# Patient Record
Sex: FEMALE | Race: WHITE | NOT HISPANIC OR LATINO | Employment: OTHER | URBAN - METROPOLITAN AREA
[De-identification: names, ages, dates, MRNs, and addresses within clinical notes are randomized per-mention and may not be internally consistent; named-entity substitution may affect disease eponyms.]

---

## 2024-01-18 NOTE — PROGRESS NOTES
Subjective     Patient ID: [id number]   Brielle Reyes is a 73 y.o. female, known to have seronegative RA?, OA of the neck, knees, chronic depression, chronic sinusitis, gastritis, GERD, DL, HTN, obesity, who presents for management of RA    Current rheum meds:  - Tylenol  - Aleve  - Dicolfenac gel    Previous rheum meds:  - Methotrexate 15 mg weekly plus FA 1 mg daily started in 9/23, stopped it 2 weeks after due to vomiting  - Leflunomide 10 mg daily started in 9/23 and kept having diarrhea, so stopped it in November 2023,   - Prednisone 10 mg with taper every 2 weeks by 2.5 mg, done in 11/23  -  mg BID started in November 2023, had nausea and diarrhea, stopped early December 2023       HPI  She was seeing Dr. Donovan, last fuv 11/23 9/23 saw her PCP for diffuse joint pains, referred her to Dr. Donovan who diagnosed her as seronegative rheumatoid arthritis  Started on methotrexate 6 tablets and folic acid as well as prednisone 10 mg for 2 weeks and taper by 2.5 mg every 2 weeks, she had a lot of vomiting so she was switched to Leflunomide, also had GI SE, so she was switched to HCQ, also had GI SE and stopped it   Currently, off everything   Saw PCP, told to try gluten free diet, it helped with her joint pains by 80% and GI sx  Seeing ortho, got steroid injections in both knees for OA  Today, ankles are sore, other joints pains improved with the gluten free diet   Swelling in the right ankle  Hands and fingers get numbness at night   MS of both knees, an hour, improves as she walks     + tension headaches   + dry eyes, uses eye drops  + miscarriage of her first pregnancy 8 weeks of gestation, no diagnoses   + used to have constipation, now more regular  + leg cramps  + itchy back, dry   + bilateral plantar fasciitis     PSH: Benign breast lumpectomy, cataracts bilaterally, appendectomy, cholecystectomy, left ovary removed  Allergies: NKDA   Habits: No smoking, no alcohol, no drugs   Social hx: ,   has dementia, 3 kids, retired teacher/     ROS:  Constitutional: Denies fever, chills, weight loss, night sweats  Eyes: Denies blurry vision, redness or pain  ENT: Denies dry mouth, dental loss, loss of taste, nasal or oral ulcers, jaw claudication, difficulty swallowing, nasal crusting or recurrent sinus infections   Cardiovascular: Denies chest pains, palpitations, orthopnea  Respiratory: Denies shortness of breath, cough, asthma, or recurrent respiratory infections  Gastrointestinal: Denies dysphagia, nausea, vomiting, heartburn, abdominal pain, constipation, diarrhea, melena or hematochezia  Genitourinary: No recurrent urinary infections or STDs, no genital or anal ulcers  Integumentary: Denies photosensitivity, rash or lesions, Raynaud's phenomenon, skin tightening, digital ulcers, psoriatic lesions, or alopecia  Neurological: Denies any numbness or tingling, muscle weakness, or incontinence   Hematologic/Lymphatic: Denies bleeding, bruising, history of clots (arterial or venous), or pregnancy complications   MSK: No joint redness, hotness or swelling. No inflammatory back pain, dactylitis  Muscular: Denies weakness, difficulty rising from chair or combing the hair, muscle aches, or problems with hand    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     Patient Active Problem List   Diagnosis    Acquired hammertoe    Acute bacterial conjunctivitis    Acute UTI    Arthritis of both knees    Bilateral otitis media    Breast pain    Chondrocalcinosis    Chronic allergic rhinitis    Chronic depression    Cyst of left breast    Depression, major, recurrent, mild (CMS/HCC)    Discomfort of vagina    Dizziness    Dysuria    Cerebrovascular disease    Essential hypertension    High blood pressure    Chronic GERD    Gastroesophageal reflux disease    Gastritis    Hypercholesterolemia    Hyperlipidemia    Jaw pain    Neck pain    Right sided temporal headache    Long-term use of immunosuppressant medication     Mastoiditis of left side    Acute non-recurrent maxillary sinusitis    Chronic sinusitis    Maxillary sinusitis    Migraines    Mucus in stool    Obesity (BMI 30-39.9)    Orthostatic hypotension    Cervical myofascial pain syndrome    Osteoarthritis of neck    Otitis media, serous, acute    Panic attacks    Panic disorder    Pelvic pain    Peripheral neuropathy    Right knee pain    Seronegative rheumatoid arthritis (CMS/HCC)    Social anxiety disorder    Nasal bleeding    Sore throat    Throat pain    Swelling in right armpit    Urinary urgency    Vaginal candidiasis    Vulvar pruritus    Weight loss      History reviewed. No pertinent past medical history.     History reviewed. No pertinent surgical history.     Social History     Socioeconomic History    Marital status:      Spouse name: Not on file    Number of children: Not on file    Years of education: Not on file    Highest education level: Not on file   Occupational History    Not on file   Tobacco Use    Smoking status: Never    Smokeless tobacco: Never   Substance and Sexual Activity    Alcohol use: Never    Drug use: Never    Sexual activity: Not on file   Other Topics Concern    Not on file   Social History Narrative    Not on file     Social Determinants of Health     Financial Resource Strain: Not on file   Food Insecurity: Not on file   Transportation Needs: Not on file   Physical Activity: Not on file   Stress: Not on file   Social Connections: Not on file   Intimate Partner Violence: Not on file   Housing Stability: Not on file      Allergies   Allergen Reactions    Allerg Xt,D.Farinae-D.Pteronys Unknown    Amoxicillin-Pot Clavulanate Unknown    Bee Pollen Unknown    House Dust Unknown    Mold Unknown    Latex Itching and Rash      Current Outpatient Medications:     acetaminophen (Tylenol 8 HOUR) 650 mg ER tablet, Take 1 tablet (650 mg) by mouth., Disp: , Rfl:     DULoxetine (Cymbalta) 30 mg DR capsule, Take 1 capsule (30 mg) by mouth once  "daily., Disp: , Rfl:     irbesartan-hydrochlorothiazide (Avalide) 150-12.5 mg tablet, Take by mouth., Disp: , Rfl:     amLODIPine (Norvasc) 10 mg tablet, Take 1 tablet (10 mg) by mouth once daily at bedtime., Disp: , Rfl:     fenofibrate micronized (Lofibra) 134 mg capsule, Take 1 capsule (134 mg) by mouth once daily with breakfast., Disp: , Rfl:     omeprazole (PriLOSEC) 40 mg DR capsule, Take 1 capsule (40 mg) by mouth once daily. Take before a meal, Disp: , Rfl:      Objective   Visit Vitals  /81 (BP Location: Right arm, Patient Position: Sitting, BP Cuff Size: Adult)   Pulse 98   Temp 36.3 °C (97.3 °F) (Temporal)   Resp 16   Ht 1.6 m (5' 3\")   Wt 77.1 kg (170 lb)   SpO2 98%   BMI 30.11 kg/m²   Smoking Status Never   BSA 1.85 m²      Physical Exam:  General: AAOx3, Cooperative  Head: normocephalic, atraumatic, no hair loss   Eyes: EOMI, conjunctiva clear, sclera white, anicteric  Ears: hearing intact  Nose: no deformity, no crusting   Throat/Mouth: No oral deformities, no cheek swelling, mucosa appear moist, no oral ulcers noted or loss of dentition   Neck/Lymph: FROM, trachea midline  Lungs: chest expansion symmetric, clear to auscultation bilaterally. No wheezing, rhonchi, or stridor  Heart: S1, S2, RRR. No murmur or rub  Abdomen: Soft, non-tender without masses  Skin: No rashes, ulcers or photosensitive areas  MSK: Upper Extremities:  Hand/Fingers: No erythema, edema, tenderness or warmth at DIP, PIP, or MCP joints, FROM grossly. Good hand . No nodules. No deformities   Wrists: Right wrist TTP. No erythema, edema, warmth or tenderness at wrist, FROM grossly  Elbows: No tenderness, edema, erythema or warmth at elbows, FROM grossly. No nodules   Shoulders: No edema, erythema, tenderness or warmth at shoulders. FROM  Lower Extremities:   Hips: No obvious deformities. No joint tenderness, normal ROM grossly. Log roll test negative bilaterally. Khris test is negative bilaterally. No trochanteric bursae " TTP  Knees: No tenderness, deformities, edema, rashes, or warmth, normal ROM grossly. No crepitus, no pes anserine bursa TTP   Ankles, feet: No deformities, tenderness, edema, erythema, ulceration, or warmth at the ankle or MTP/IP joints, normal ROM grossly. TTP of bilateral heels   Spine: No spinal tenderness to palpation. Right SI joint tenderness      Assessment/Plan    This is a 73 y.o. female, known to have seronegative RA?? and OA of the neck and knees, knees, who presents for a second opinion    The patient's picture currently is not suggestive or RA or CTD, her joint pains improved after gluten free diet, no current synovitis on exam. Her work up with Dr. Donovan is positive for SANDOVAL and Anti-centromere, no sx currently of scleroderma. She tried 3 different DMARDs with SE to all 3, she also took steroids for 2 months without any improvement in her sx, suggestive more of mechanical and less of inflammatory disease at this moment. Will complete the work up     Labs:  11/23: SANDOVAL positive, RF/CCP negative, CBC, SPEP, Uprt/Cr, Cr 1.13, AP 43, (Low), CRP, TSH, CK, C4, C3, B12, FT4 wnl  STEPHANIE panel showing anti-centromere 1188    Imaging:  Xray knees 3/2022 and 5/2022: No results     - Labs tests today   - Xray hands   - keep off tx for now  - Continue off gluten  - Tylenol as needed for pain  - I will Inform the pt of any urgent results, if any     RTC in 1 month or discussion of results     Plan, including risks and benefits, was discussed with the patient, informed on how to reach us.     To schedule an appointment, call [phone]  To reach the rheumatology office, call [phone]    La Huang MD   Division of Rheumatology  [facility]

## 2024-01-22 ENCOUNTER — LAB (OUTPATIENT)
Dept: LAB | Facility: LAB | Age: 74
End: 2024-01-22
Payer: MEDICARE

## 2024-01-22 ENCOUNTER — HOSPITAL ENCOUNTER (OUTPATIENT)
Dept: RADIOLOGY | Facility: CLINIC | Age: 74
Discharge: HOME | End: 2024-01-22
Payer: MEDICARE

## 2024-01-22 ENCOUNTER — OFFICE VISIT (OUTPATIENT)
Dept: RHEUMATOLOGY | Facility: CLINIC | Age: 74
End: 2024-01-22
Payer: MEDICARE

## 2024-01-22 VITALS
OXYGEN SATURATION: 98 % | RESPIRATION RATE: 16 BRPM | BODY MASS INDEX: 30.12 KG/M2 | SYSTOLIC BLOOD PRESSURE: 146 MMHG | WEIGHT: 170 LBS | HEIGHT: 63 IN | DIASTOLIC BLOOD PRESSURE: 81 MMHG | TEMPERATURE: 97.3 F | HEART RATE: 98 BPM

## 2024-01-22 DIAGNOSIS — E55.9 VITAMIN D DEFICIENCY: ICD-10-CM

## 2024-01-22 DIAGNOSIS — R76.8 POSITIVE ANA (ANTINUCLEAR ANTIBODY): ICD-10-CM

## 2024-01-22 DIAGNOSIS — M25.50 POLYARTHRALGIA: ICD-10-CM

## 2024-01-22 DIAGNOSIS — R76.8 ANTICENTROMERE ANTIBODIES PRESENT: ICD-10-CM

## 2024-01-22 DIAGNOSIS — M25.50 POLYARTHRALGIA: Primary | ICD-10-CM

## 2024-01-22 PROBLEM — I10 ESSENTIAL HYPERTENSION: Status: ACTIVE | Noted: 2020-12-15

## 2024-01-22 PROBLEM — G43.909 MIGRAINES: Status: ACTIVE | Noted: 2024-01-22

## 2024-01-22 PROBLEM — H10.30 ACUTE BACTERIAL CONJUNCTIVITIS: Status: ACTIVE | Noted: 2024-01-22

## 2024-01-22 PROBLEM — I67.9 CEREBROVASCULAR DISEASE: Status: ACTIVE | Noted: 2024-01-22

## 2024-01-22 PROBLEM — B37.31 VAGINAL CANDIDIASIS: Status: ACTIVE | Noted: 2023-11-28

## 2024-01-22 PROBLEM — J01.00 ACUTE NON-RECURRENT MAXILLARY SINUSITIS: Status: ACTIVE | Noted: 2024-01-22

## 2024-01-22 PROBLEM — I95.1 ORTHOSTATIC HYPOTENSION: Status: ACTIVE | Noted: 2024-01-22

## 2024-01-22 PROBLEM — M79.89 SWELLING IN RIGHT ARMPIT: Status: ACTIVE | Noted: 2024-01-22

## 2024-01-22 PROBLEM — J32.0 MAXILLARY SINUSITIS: Status: ACTIVE | Noted: 2024-01-22

## 2024-01-22 PROBLEM — N94.9 DISCOMFORT OF VAGINA: Status: ACTIVE | Noted: 2024-01-22

## 2024-01-22 PROBLEM — M17.0 ARTHRITIS OF BOTH KNEES: Status: ACTIVE | Noted: 2023-07-11

## 2024-01-22 PROBLEM — R04.0 NASAL BLEEDING: Status: ACTIVE | Noted: 2024-01-22

## 2024-01-22 PROBLEM — R39.15 URINARY URGENCY: Status: ACTIVE | Noted: 2024-01-22

## 2024-01-22 PROBLEM — R51.9 RIGHT SIDED TEMPORAL HEADACHE: Status: ACTIVE | Noted: 2024-01-22

## 2024-01-22 PROBLEM — F41.0 PANIC ATTACKS: Status: ACTIVE | Noted: 2024-01-22

## 2024-01-22 PROBLEM — R30.0 DYSURIA: Status: ACTIVE | Noted: 2024-01-22

## 2024-01-22 PROBLEM — E78.5 HYPERLIPIDEMIA: Status: ACTIVE | Noted: 2020-12-15

## 2024-01-22 PROBLEM — M79.18 CERVICAL MYOFASCIAL PAIN SYNDROME: Status: ACTIVE | Noted: 2024-01-22

## 2024-01-22 PROBLEM — H70.92 MASTOIDITIS OF LEFT SIDE: Status: ACTIVE | Noted: 2024-01-22

## 2024-01-22 PROBLEM — M54.2 NECK PAIN: Status: ACTIVE | Noted: 2024-01-22

## 2024-01-22 PROBLEM — F41.0 PANIC DISORDER: Status: ACTIVE | Noted: 2024-01-22

## 2024-01-22 PROBLEM — F32.A CHRONIC DEPRESSION: Status: ACTIVE | Noted: 2024-01-22

## 2024-01-22 PROBLEM — E78.00 HYPERCHOLESTEROLEMIA: Status: ACTIVE | Noted: 2024-01-22

## 2024-01-22 PROBLEM — J02.9 SORE THROAT: Status: ACTIVE | Noted: 2024-01-22

## 2024-01-22 PROBLEM — J30.9 CHRONIC ALLERGIC RHINITIS: Status: ACTIVE | Noted: 2024-01-22

## 2024-01-22 PROBLEM — H66.93 BILATERAL OTITIS MEDIA: Status: ACTIVE | Noted: 2024-01-22

## 2024-01-22 PROBLEM — M47.812 OSTEOARTHRITIS OF NECK: Status: ACTIVE | Noted: 2024-01-22

## 2024-01-22 PROBLEM — K29.70 GASTRITIS: Status: ACTIVE | Noted: 2024-01-22

## 2024-01-22 PROBLEM — N39.0 ACUTE UTI: Status: ACTIVE | Noted: 2024-01-22

## 2024-01-22 PROBLEM — L29.2 VULVAR PRURITUS: Status: ACTIVE | Noted: 2024-01-22

## 2024-01-22 PROBLEM — I10 HIGH BLOOD PRESSURE: Status: ACTIVE | Noted: 2024-01-22

## 2024-01-22 PROBLEM — K21.9 GASTROESOPHAGEAL REFLUX DISEASE: Status: ACTIVE | Noted: 2020-12-15

## 2024-01-22 PROBLEM — K21.9 CHRONIC GERD: Status: ACTIVE | Noted: 2024-01-22

## 2024-01-22 PROBLEM — M25.561 RIGHT KNEE PAIN: Status: ACTIVE | Noted: 2024-01-22

## 2024-01-22 PROBLEM — F33.0 DEPRESSION, MAJOR, RECURRENT, MILD (CMS-HCC): Status: ACTIVE | Noted: 2024-01-22

## 2024-01-22 PROBLEM — J32.9 CHRONIC SINUSITIS: Status: ACTIVE | Noted: 2024-01-22

## 2024-01-22 PROBLEM — M20.40 ACQUIRED HAMMERTOE: Status: ACTIVE | Noted: 2024-01-22

## 2024-01-22 PROBLEM — R42 DIZZINESS: Status: ACTIVE | Noted: 2024-01-22

## 2024-01-22 PROBLEM — F40.10 SOCIAL ANXIETY DISORDER: Status: ACTIVE | Noted: 2024-01-22

## 2024-01-22 PROBLEM — H65.00 OTITIS MEDIA, SEROUS, ACUTE: Status: ACTIVE | Noted: 2024-01-22

## 2024-01-22 PROBLEM — R07.0 THROAT PAIN: Status: ACTIVE | Noted: 2024-01-22

## 2024-01-22 PROBLEM — R63.4 WEIGHT LOSS: Status: ACTIVE | Noted: 2024-01-22

## 2024-01-22 PROBLEM — R68.84 JAW PAIN: Status: ACTIVE | Noted: 2024-01-22

## 2024-01-22 PROBLEM — G62.9 PERIPHERAL NEUROPATHY: Status: ACTIVE | Noted: 2024-01-22

## 2024-01-22 PROBLEM — M06.00 SERONEGATIVE RHEUMATOID ARTHRITIS (MULTI): Status: ACTIVE | Noted: 2023-11-28

## 2024-01-22 PROBLEM — E66.9 OBESITY (BMI 30-39.9): Status: ACTIVE | Noted: 2024-01-22

## 2024-01-22 PROBLEM — N60.02 CYST OF LEFT BREAST: Status: ACTIVE | Noted: 2024-01-22

## 2024-01-22 PROBLEM — N64.4 BREAST PAIN: Status: ACTIVE | Noted: 2024-01-22

## 2024-01-22 PROBLEM — Z79.60 LONG-TERM USE OF IMMUNOSUPPRESSANT MEDICATION: Status: ACTIVE | Noted: 2023-11-28

## 2024-01-22 PROBLEM — M11.20 CHONDROCALCINOSIS: Status: ACTIVE | Noted: 2024-01-22

## 2024-01-22 PROBLEM — R10.2 PELVIC PAIN: Status: ACTIVE | Noted: 2024-01-22

## 2024-01-22 PROBLEM — R19.5 MUCUS IN STOOL: Status: ACTIVE | Noted: 2023-11-28

## 2024-01-22 LAB
25(OH)D3 SERPL-MCNC: 30 NG/ML (ref 30–100)
ALBUMIN SERPL BCP-MCNC: 4.5 G/DL (ref 3.4–5)
ALP SERPL-CCNC: 37 U/L (ref 33–136)
ALT SERPL W P-5'-P-CCNC: 18 U/L (ref 7–45)
ANION GAP SERPL CALC-SCNC: 14 MMOL/L (ref 10–20)
AST SERPL W P-5'-P-CCNC: 29 U/L (ref 9–39)
BASOPHILS # BLD AUTO: 0.06 X10*3/UL (ref 0–0.1)
BASOPHILS NFR BLD AUTO: 0.9 %
BILIRUB SERPL-MCNC: 0.4 MG/DL (ref 0–1.2)
BUN SERPL-MCNC: 17 MG/DL (ref 6–23)
C3 SERPL-MCNC: 177 MG/DL (ref 87–200)
C4 SERPL-MCNC: 33 MG/DL (ref 10–50)
CALCIUM SERPL-MCNC: 9.9 MG/DL (ref 8.6–10.3)
CHLORIDE SERPL-SCNC: 104 MMOL/L (ref 98–107)
CK SERPL-CCNC: 45 U/L (ref 0–215)
CO2 SERPL-SCNC: 28 MMOL/L (ref 21–32)
CREAT SERPL-MCNC: 0.94 MG/DL (ref 0.5–1.05)
CREAT UR-MCNC: 89.2 MG/DL (ref 20–320)
CRP SERPL-MCNC: 0.11 MG/DL
EGFRCR SERPLBLD CKD-EPI 2021: 65 ML/MIN/1.73M*2
EOSINOPHIL # BLD AUTO: 0.28 X10*3/UL (ref 0–0.4)
EOSINOPHIL NFR BLD AUTO: 4 %
ERYTHROCYTE [DISTWIDTH] IN BLOOD BY AUTOMATED COUNT: 15.4 % (ref 11.5–14.5)
ERYTHROCYTE [SEDIMENTATION RATE] IN BLOOD BY WESTERGREN METHOD: 10 MM/H (ref 0–30)
GLUCOSE SERPL-MCNC: 100 MG/DL (ref 74–99)
HCT VFR BLD AUTO: 42.9 % (ref 36–46)
HGB BLD-MCNC: 13.3 G/DL (ref 12–16)
IMM GRANULOCYTES # BLD AUTO: 0.02 X10*3/UL (ref 0–0.5)
IMM GRANULOCYTES NFR BLD AUTO: 0.3 % (ref 0–0.9)
LYMPHOCYTES # BLD AUTO: 2.1 X10*3/UL (ref 0.8–3)
LYMPHOCYTES NFR BLD AUTO: 30.3 %
MCH RBC QN AUTO: 27.5 PG (ref 26–34)
MCHC RBC AUTO-ENTMCNC: 31 G/DL (ref 32–36)
MCV RBC AUTO: 89 FL (ref 80–100)
MONOCYTES # BLD AUTO: 0.99 X10*3/UL (ref 0.05–0.8)
MONOCYTES NFR BLD AUTO: 14.3 %
NEUTROPHILS # BLD AUTO: 3.47 X10*3/UL (ref 1.6–5.5)
NEUTROPHILS NFR BLD AUTO: 50.2 %
NRBC BLD-RTO: 0 /100 WBCS (ref 0–0)
PLATELET # BLD AUTO: 328 X10*3/UL (ref 150–450)
POTASSIUM SERPL-SCNC: 4.4 MMOL/L (ref 3.5–5.3)
PROT SERPL-MCNC: 7 G/DL (ref 6.4–8.2)
PROT SERPL-MCNC: 7.3 G/DL (ref 6.4–8.2)
PROT UR-ACNC: 13 MG/DL (ref 5–24)
PROT/CREAT UR: 0.15 MG/MG CREAT (ref 0–0.17)
RBC # BLD AUTO: 4.83 X10*6/UL (ref 4–5.2)
RHEUMATOID FACT SER NEPH-ACNC: <10 IU/ML (ref 0–15)
SODIUM SERPL-SCNC: 142 MMOL/L (ref 136–145)
TSH SERPL-ACNC: 2.04 MIU/L (ref 0.44–3.98)
WBC # BLD AUTO: 6.9 X10*3/UL (ref 4.4–11.3)

## 2024-01-22 PROCEDURE — 84156 ASSAY OF PROTEIN URINE: CPT

## 2024-01-22 PROCEDURE — 86235 NUCLEAR ANTIGEN ANTIBODY: CPT

## 2024-01-22 PROCEDURE — 86320 SERUM IMMUNOELECTROPHORESIS: CPT | Performed by: STUDENT IN AN ORGANIZED HEALTH CARE EDUCATION/TRAINING PROGRAM

## 2024-01-22 PROCEDURE — 36415 COLL VENOUS BLD VENIPUNCTURE: CPT

## 2024-01-22 PROCEDURE — 82550 ASSAY OF CK (CPK): CPT

## 2024-01-22 PROCEDURE — 85652 RBC SED RATE AUTOMATED: CPT

## 2024-01-22 PROCEDURE — 86334 IMMUNOFIX E-PHORESIS SERUM: CPT

## 2024-01-22 PROCEDURE — 85025 COMPLETE CBC W/AUTO DIFF WBC: CPT

## 2024-01-22 PROCEDURE — 99205 OFFICE O/P NEW HI 60 MIN: CPT | Performed by: STUDENT IN AN ORGANIZED HEALTH CARE EDUCATION/TRAINING PROGRAM

## 2024-01-22 PROCEDURE — 73130 X-RAY EXAM OF HAND: CPT | Mod: 50

## 2024-01-22 PROCEDURE — 84165 PROTEIN E-PHORESIS SERUM: CPT | Performed by: STUDENT IN AN ORGANIZED HEALTH CARE EDUCATION/TRAINING PROGRAM

## 2024-01-22 PROCEDURE — 80053 COMPREHEN METABOLIC PANEL: CPT

## 2024-01-22 PROCEDURE — 1159F MED LIST DOCD IN RCRD: CPT | Performed by: STUDENT IN AN ORGANIZED HEALTH CARE EDUCATION/TRAINING PROGRAM

## 2024-01-22 PROCEDURE — 86160 COMPLEMENT ANTIGEN: CPT

## 2024-01-22 PROCEDURE — 86225 DNA ANTIBODY NATIVE: CPT

## 2024-01-22 PROCEDURE — 82570 ASSAY OF URINE CREATININE: CPT

## 2024-01-22 PROCEDURE — 84165 PROTEIN E-PHORESIS SERUM: CPT

## 2024-01-22 PROCEDURE — 3077F SYST BP >= 140 MM HG: CPT | Performed by: STUDENT IN AN ORGANIZED HEALTH CARE EDUCATION/TRAINING PROGRAM

## 2024-01-22 PROCEDURE — 86140 C-REACTIVE PROTEIN: CPT

## 2024-01-22 PROCEDURE — 82306 VITAMIN D 25 HYDROXY: CPT

## 2024-01-22 PROCEDURE — 84443 ASSAY THYROID STIM HORMONE: CPT

## 2024-01-22 PROCEDURE — 1125F AMNT PAIN NOTED PAIN PRSNT: CPT | Performed by: STUDENT IN AN ORGANIZED HEALTH CARE EDUCATION/TRAINING PROGRAM

## 2024-01-22 PROCEDURE — 81381 HLA I TYPING 1 ALLELE HR: CPT

## 2024-01-22 PROCEDURE — 73130 X-RAY EXAM OF HAND: CPT | Mod: BILATERAL PROCEDURE | Performed by: RADIOLOGY

## 2024-01-22 PROCEDURE — 86431 RHEUMATOID FACTOR QUANT: CPT

## 2024-01-22 PROCEDURE — 84155 ASSAY OF PROTEIN SERUM: CPT

## 2024-01-22 PROCEDURE — 3079F DIAST BP 80-89 MM HG: CPT | Performed by: STUDENT IN AN ORGANIZED HEALTH CARE EDUCATION/TRAINING PROGRAM

## 2024-01-22 PROCEDURE — 1036F TOBACCO NON-USER: CPT | Performed by: STUDENT IN AN ORGANIZED HEALTH CARE EDUCATION/TRAINING PROGRAM

## 2024-01-22 PROCEDURE — 86038 ANTINUCLEAR ANTIBODIES: CPT

## 2024-01-22 PROCEDURE — 82085 ASSAY OF ALDOLASE: CPT

## 2024-01-22 RX ORDER — FENOFIBRATE 134 MG/1
134 CAPSULE ORAL
COMMUNITY

## 2024-01-22 RX ORDER — IRBESARTAN AND HYDROCHLOROTHIAZIDE 150; 12.5 MG/1; MG/1
TABLET, FILM COATED ORAL
COMMUNITY
Start: 2020-11-27

## 2024-01-22 RX ORDER — AMLODIPINE BESYLATE 10 MG/1
10 TABLET ORAL NIGHTLY
COMMUNITY

## 2024-01-22 RX ORDER — OMEPRAZOLE 40 MG/1
40 CAPSULE, DELAYED RELEASE ORAL DAILY
COMMUNITY

## 2024-01-22 RX ORDER — DEXTROMETHORPHAN HYDROBROMIDE, GUAIFENESIN 5; 100 MG/5ML; MG/5ML
650 LIQUID ORAL
COMMUNITY
Start: 2020-04-27

## 2024-01-22 RX ORDER — DULOXETIN HYDROCHLORIDE 30 MG/1
30 CAPSULE, DELAYED RELEASE ORAL DAILY
COMMUNITY
Start: 2020-11-27

## 2024-01-22 ASSESSMENT — PAIN SCALES - GENERAL: PAINLEVEL: 4

## 2024-01-24 LAB
ANA PATTERN: ABNORMAL
ANA SER QL HEP2 SUBST: POSITIVE
ANA TITR SER IF: ABNORMAL {TITER}
CENTROMERE B AB SER-ACNC: >8 AI
CHROMATIN AB SERPL-ACNC: 0.2 AI
DSDNA AB SER-ACNC: 1 IU/ML
ENA JO1 AB SER QL IA: <0.2 AI
ENA RNP AB SER IA-ACNC: <0.2 AI
ENA SCL70 AB SER QL IA: <0.2 AI
ENA SM AB SER IA-ACNC: <0.2 AI
ENA SM+RNP AB SER QL IA: <0.2 AI
ENA SS-A AB SER IA-ACNC: <0.2 AI
ENA SS-B AB SER IA-ACNC: <0.2 AI
HLAB27 TYPING: NEGATIVE
RIBOSOMAL P AB SER-ACNC: <0.2 AI

## 2024-01-25 LAB — ALDOLASE SERPL-CCNC: 4.7 U/L (ref 1.2–7.6)

## 2024-01-29 ENCOUNTER — APPOINTMENT (OUTPATIENT)
Dept: RHEUMATOLOGY | Facility: CLINIC | Age: 74
End: 2024-01-29
Payer: MEDICARE

## 2024-01-29 LAB
ALBUMIN: 4.3 G/DL (ref 3.4–5)
ALPHA 1 GLOBULIN: 0.3 G/DL (ref 0.2–0.6)
ALPHA 2 GLOBULIN: 0.7 G/DL (ref 0.4–1.1)
BETA GLOBULIN: 1 G/DL (ref 0.5–1.2)
GAMMA GLOBULIN: 1 G/DL (ref 0.5–1.4)
IMMUNOFIXATION COMMENT: NORMAL
PATH REVIEW - SERUM IMMUNOFIXATION: NORMAL
PATH REVIEW-SERUM PROTEIN ELECTROPHORESIS: NORMAL
PROTEIN ELECTROPHORESIS COMMENT: NORMAL

## 2024-02-29 NOTE — PROGRESS NOTES
Subjective   Patient ID: [id number]   Brielle Reyes is a 73 y.o. female, known to have seronegative RA?, fibromyalgia, OA of the neck, knees, chronic depression, chronic sinusitis, gastritis, GERD, DL, HTN, obesity, who presents for follow up for management of RA    Current rheum meds:  - Tylenol  - Aleve  - Dicolfenac gel    Previous rheum meds:  - Methotrexate 15 mg weekly plus FA 1 mg daily started in 9/23, stopped it 2 weeks after due to vomiting  - Leflunomide 10 mg daily started in 9/23 and kept having diarrhea, so stopped it in November 2023  - Prednisone 10 mg with taper every 2 weeks by 2.5 mg, done in 11/23  -  mg BID started in November 2023, had nausea and diarrhea, stopped early December 2023     HPI   The patient is not doing well  Has vertigo today and thinks she might have a sinus infection   Ankles, knees, wrists, hips, shoulders pains  All joint pains came together, lasted around 2 weeks  After that felt better  Back hurts when she is bending forward  + severe stress  + severe depression  + severe anxiety  + poor sleep  + fatigue  + fibromyalgia  + body itches all the time, saw dermatology and told it was eczema and started on steroid creams and lotions, they helped  + massage around [place], caused pain in her all body even though it was mainly in her neck   + Right ankle pain and swelling, saw ortho and was told her ligaments and tendons are inflamed     Morning stiffness of only a few minutes   No episodes of eye inflammation  No sicca sx  No chest pain, cough or dyspnea  No infections    ROS:  As per HPI     Rheum hx:  She was seeing Dr. Donovan, last fuv 11/23 9/23 saw her PCP for diffuse joint pains, referred her to Dr. Donovan who diagnosed her as seronegative rheumatoid arthritis  Started on methotrexate 6 tablets and folic acid as well as prednisone 10 mg for 2 weeks and taper by 2.5 mg every 2 weeks, she had a lot of vomiting so she was switched to Leflunomide, also had GI SE,  so she was switched to HCQ, also had GI SE and stopped it   Currently, off everything   Saw PCP, told to try gluten free diet, it helped with her joint pains by 80% and GI sx  Seeing ortho, got steroid injections in both knees for OA  Today, ankles are sore, other joints pains improved with the gluten free diet   Swelling in the right ankle  Hands and fingers get numbness at night   MS of both knees, an hour, improves as she walks     + tension headaches   + dry eyes, uses eye drops  + miscarriage of her first pregnancy 8 weeks of gestation, no diagnoses   + used to have constipation, now more regular  + leg cramps  + itchy back, dry   + bilateral plantar fasciitis     PSH: Benign breast lumpectomy, cataracts bilaterally, appendectomy, cholecystectomy, left ovary removed  Allergies: NKDA   Habits: No smoking, no alcohol, no drugs   Social hx: ,  has dementia, 3 kids, retired teacher/   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     Patient Active Problem List   Diagnosis    Acquired hammertoe    Acute bacterial conjunctivitis    Acute UTI    Arthritis of both knees    Bilateral otitis media    Breast pain    Chondrocalcinosis    Chronic allergic rhinitis    Chronic depression    Cyst of left breast    Depression, major, recurrent, mild (CMS/HCC)    Discomfort of vagina    Dizziness    Dysuria    Cerebrovascular disease    Essential hypertension    High blood pressure    Chronic GERD    Gastroesophageal reflux disease    Gastritis    Hypercholesterolemia    Hyperlipidemia    Jaw pain    Neck pain    Right sided temporal headache    Long-term use of immunosuppressant medication    Mastoiditis of left side    Acute non-recurrent maxillary sinusitis    Chronic sinusitis    Maxillary sinusitis    Migraines    Mucus in stool    Obesity (BMI 30-39.9)    Orthostatic hypotension    Cervical myofascial pain syndrome    Osteoarthritis of neck    Otitis media, serous, acute    Panic attacks    Panic disorder    Pelvic  pain    Peripheral neuropathy    Right knee pain    Seronegative rheumatoid arthritis (CMS/HCC)    Social anxiety disorder    Nasal bleeding    Sore throat    Throat pain    Swelling in right armpit    Urinary urgency    Vaginal candidiasis    Vulvar pruritus    Weight loss      History reviewed. No pertinent past medical history.     History reviewed. No pertinent surgical history.     Social History     Socioeconomic History    Marital status:      Spouse name: Not on file    Number of children: Not on file    Years of education: Not on file    Highest education level: Not on file   Occupational History    Not on file   Tobacco Use    Smoking status: Never    Smokeless tobacco: Never   Substance and Sexual Activity    Alcohol use: Never    Drug use: Never    Sexual activity: Not on file   Other Topics Concern    Not on file   Social History Narrative    Not on file     Social Determinants of Health     Financial Resource Strain: Not on file   Food Insecurity: Not on file   Transportation Needs: Not on file   Physical Activity: Not on file   Stress: Not on file   Social Connections: Not on file   Intimate Partner Violence: Not on file   Housing Stability: Not on file      Allergies   Allergen Reactions    Allerg Xt,D.Farinae-D.Pteronys Unknown    Amoxicillin-Pot Clavulanate Unknown    Bee Pollen Unknown    House Dust Unknown    Mold Unknown    Latex Itching and Rash      Current Outpatient Medications:     acetaminophen (Tylenol 8 HOUR) 650 mg ER tablet, Take 1 tablet (650 mg) by mouth., Disp: , Rfl:     amLODIPine (Norvasc) 10 mg tablet, Take 1 tablet (10 mg) by mouth once daily at bedtime., Disp: , Rfl:     DULoxetine (Cymbalta) 30 mg DR capsule, Take 1 capsule (30 mg) by mouth once daily., Disp: , Rfl:     fenofibrate micronized (Lofibra) 134 mg capsule, Take 1 capsule (134 mg) by mouth once daily with breakfast., Disp: , Rfl:     irbesartan-hydrochlorothiazide (Avalide) 150-12.5 mg tablet, Take by  mouth., Disp: , Rfl:     omeprazole (PriLOSEC) 40 mg DR capsule, Take 1 capsule (40 mg) by mouth once daily. Take before a meal, Disp: , Rfl:     predniSONE (Deltasone) 10 mg tablet, Take 1.5 tablets (15 mg) by mouth once daily for 5 days, THEN 1 tablet (10 mg) once daily for 5 days, THEN 0.5 tablets (5 mg) once daily for 5 days., Disp: 15 tablet, Rfl: 1     Objective   Visit Vitals  /79   Pulse 103   Temp 36.7 °C (98.1 °F)   Resp 20   Wt 78.5 kg (173 lb)   BMI 30.65 kg/m²   Smoking Status Never   BSA 1.87 m²      Physical Exam:  General: AAOx3, Cooperative  Head: normocephalic, atraumatic, no hair loss   Eyes: EOMI, conjunctiva clear, sclera white, anicteric  Ears: hearing intact  Nose: no deformity, no crusting   Skin: No rashes, ulcers or photosensitive areas  MSK: Upper Extremities:  Hand/Fingers: No erythema, edema, tenderness or warmth at DIP, PIP, or MCP joints, FROM grossly. Good hand . No nodules. No deformities   Wrists: Right wrist TTP. No erythema, edema, warmth or tenderness at wrist, FROM grossly  Elbows: No tenderness, edema, erythema or warmth at elbows, FROM grossly. No nodules   Shoulders: No edema, erythema, tenderness or warmth at shoulders. FROM  Lower Extremities:   Hips: No obvious deformities. No joint tenderness, normal ROM grossly. Log roll test negative bilaterally. Khris test is negative bilaterally. No trochanteric bursae TTP  Knees: No tenderness, deformities, edema, rashes, or warmth, normal ROM grossly. No crepitus, no pes anserine bursa TTP   Ankles, feet: No deformities, tenderness, edema, erythema, ulceration, or warmth at the ankle or MTP/IP joints, normal ROM grossly. TTP of bilateral heels   Spine: No spinal tenderness to palpation. Right SI joint tenderness      Assessment/Plan    This is a 73 y.o. female, known to have seronegative RA?? and OA of the neck and knees, knees, who presents for a second opinion for management of RA  Last seen in 1/24    The patient's  picture currently is not suggestive or RA or CTD, her joint pains improved after gluten free diet, no current synovitis on exam but recurred 2 weeks ago, more likely related to OA and fibromyalgia. Her work up with Dr. Donovan is positive for SANDOVAL and Anti-centromere, no sx currently of scleroderma. Repeat work up showed positive SANDOVAL and anti-centromere, only has GERD as manifestation of possible CREST syndrome. She tried 3 different DMARDs with GI SE to all 3, could be related to her diet or IBS, she also took steroids for 2 months without any improvement in her sx, suggestive more of mechanical and less of inflammatory disease at this moment. CCP was not done. Normal inflammatory markers, complements and muscle enzymes. No proteinuria. Will try another prednisone trial, if no improvement (more likely expected), then it is OA and fibromyalgia, not IA and no need for IS. Extensive discussion done with the patient and her daughter about all possible etiologies, manifestations and treatment. I answered all their questions     Labs:  1/24: CBC, CMP, C3, C4, SPEP, CPK, Aldolase, vitamin D 30, Uprt, CRP, ESR, TSH wnl  SANDOVAL 1:2560, Anti centromere >8, RF, HLA B27 negative   11/23: SANDOVAL positive, RF/CCP negative, CBC, SPEP, Uprt/Cr, Cr 1.13, AP 43, (Low), CRP, TSH, CK, C4, C3, B12, FT4 wnl  STEPHANIE panel showing anti-centromere 1188    Imaging:  Xray hands 1/24: OA bilaterally    Xray knees 3/2022 and 5/2022: No results     - Prednisone trial   - If there is improvement, will consider putting her on HCQ and monitor if she has SE again now that she is doing a gluten free diet   - Do CCP   - Continue off gluten  - Tylenol as needed for pain  - Monitor for scleroderma symptoms, only GERD now    RTC in 3 months    Plan, including risks and benefits, was discussed with the patient, informed on how to reach us.     To schedule an appointment, call [phone]  To reach the rheumatology office, call [phone]    La Huang,  MD   Division of Rheumatology  [facility]      newly diagnosed HFrEF (had previous hx of HFpEF). will need ischemic eval pending Cr stabilization and nephrology clearance  - c/w metoprolol XL 50mg daily, hydralazine increased to 37.5mg TID, isordil 10mg TID  - continue to optimize GDMT as tolerated  - HF following, recs appreciated  - NM amyloid scan not suggestive of cardiac amyloidosis  - plan for L+RHC when Cr stable, appears to be plateauing now at 1.76  - Nephro consulted for pre-cath optimization, will  need tirso-procedure IVF to reduce risk of SHANE  - cleared by vascular surgery to resume hep gtt, which was started on 8/1

## 2024-03-12 ENCOUNTER — OFFICE VISIT (OUTPATIENT)
Dept: RHEUMATOLOGY | Facility: CLINIC | Age: 74
End: 2024-03-12
Payer: MEDICARE

## 2024-03-12 VITALS
TEMPERATURE: 98.1 F | HEART RATE: 103 BPM | SYSTOLIC BLOOD PRESSURE: 138 MMHG | DIASTOLIC BLOOD PRESSURE: 79 MMHG | BODY MASS INDEX: 30.65 KG/M2 | WEIGHT: 173 LBS | RESPIRATION RATE: 20 BRPM

## 2024-03-12 DIAGNOSIS — M25.50 POLYARTHRALGIA: Primary | ICD-10-CM

## 2024-03-12 DIAGNOSIS — M19.042 PRIMARY OSTEOARTHRITIS OF BOTH HANDS: ICD-10-CM

## 2024-03-12 DIAGNOSIS — E55.9 VITAMIN D DEFICIENCY: ICD-10-CM

## 2024-03-12 DIAGNOSIS — R76.8 ANTICENTROMERE ANTIBODIES PRESENT: ICD-10-CM

## 2024-03-12 DIAGNOSIS — R76.8 POSITIVE ANA (ANTINUCLEAR ANTIBODY): ICD-10-CM

## 2024-03-12 DIAGNOSIS — M19.041 PRIMARY OSTEOARTHRITIS OF BOTH HANDS: ICD-10-CM

## 2024-03-12 PROCEDURE — 1159F MED LIST DOCD IN RCRD: CPT | Performed by: STUDENT IN AN ORGANIZED HEALTH CARE EDUCATION/TRAINING PROGRAM

## 2024-03-12 PROCEDURE — 1036F TOBACCO NON-USER: CPT | Performed by: STUDENT IN AN ORGANIZED HEALTH CARE EDUCATION/TRAINING PROGRAM

## 2024-03-12 PROCEDURE — 3075F SYST BP GE 130 - 139MM HG: CPT | Performed by: STUDENT IN AN ORGANIZED HEALTH CARE EDUCATION/TRAINING PROGRAM

## 2024-03-12 PROCEDURE — 1125F AMNT PAIN NOTED PAIN PRSNT: CPT | Performed by: STUDENT IN AN ORGANIZED HEALTH CARE EDUCATION/TRAINING PROGRAM

## 2024-03-12 PROCEDURE — 99215 OFFICE O/P EST HI 40 MIN: CPT | Performed by: STUDENT IN AN ORGANIZED HEALTH CARE EDUCATION/TRAINING PROGRAM

## 2024-03-12 PROCEDURE — 1160F RVW MEDS BY RX/DR IN RCRD: CPT | Performed by: STUDENT IN AN ORGANIZED HEALTH CARE EDUCATION/TRAINING PROGRAM

## 2024-03-12 PROCEDURE — 3078F DIAST BP <80 MM HG: CPT | Performed by: STUDENT IN AN ORGANIZED HEALTH CARE EDUCATION/TRAINING PROGRAM

## 2024-03-12 RX ORDER — PREDNISONE 10 MG/1
TABLET ORAL
Qty: 15 TABLET | Refills: 1 | Status: SHIPPED | OUTPATIENT
Start: 2024-03-12 | End: 2024-03-27

## 2024-05-07 NOTE — PROGRESS NOTES
Subjective   Patient ID: [id number]   Brielle Reyes is a 74 y.o. female, known to have seronegative RA?, fibromyalgia, OA of the neck, knees, chronic depression, chronic sinusitis, gastritis, GERD, DL, HTN, obesity, who presents for follow up for management of RA    Current rheum meds:  - Tylenol  - Aleve  - Dicolfenac gel    Previous rheum meds:  - Methotrexate 15 mg weekly plus FA 1 mg daily started in 9/23, stopped it 2 weeks after due to vomiting  - Leflunomide 10 mg daily started in 9/23 and kept having diarrhea, so stopped it in November 2023  - Prednisone 10 mg with taper every 2 weeks by 2.5 mg, done in 11/23  -  mg BID started in November 2023, had nausea and diarrhea, stopped early December 2023     HPI   Had steroid injections in her knees a month ago with ortho  Prednisone trial helped her ankles a lot, especially stiffness and swelling   Did PT for her right ankle, she couldn't walk the next day  Started Qijonj exercises at home for the knees and ankles, been doing it a couple of weeks, helping her a lot   Sarkis Tchi as well, that is helping  Started Zyflamed, herbal supplements, 1 capsule BID, helping her knees a lot  Doing a meditation oliva, called Balance, doing better as well, helping with stress and dealing with her  who has dementia   Overall, all her joints are better, feeling great  Walking better   Seeing derm for eczema, she itches a lot, uses a steroid cream, told her she might need IM steroids     + severe stress  + severe depression  + severe anxiety  + poor sleep  + fatigue  + fibromyalgia  + body itches all the time, saw dermatology and told it was eczema and started on steroid creams and lotions, they helped  + massage around [place], caused pain in her all body even though it was mainly in her neck   + Right ankle pain and swelling, saw ortho and was told her ligaments and tendons are inflamed     Morning stiffness of only a few minutes   No rashes  No episodes of eye  inflammation  No sicca sx  No chest pain, cough or dyspnea  No infections    ROS:  As per HPI     Rheum hx:  She was seeing Dr. Donovan, last fuv 11/23 9/23 saw her PCP for diffuse joint pains, referred her to Dr. Donovan who diagnosed her as seronegative rheumatoid arthritis  Started on methotrexate 6 tablets and folic acid as well as prednisone 10 mg for 2 weeks and taper by 2.5 mg every 2 weeks, she had a lot of vomiting so she was switched to Leflunomide, also had GI SE, so she was switched to HCQ, also had GI SE and stopped it   Currently, off everything   Saw PCP, told to try gluten free diet, it helped with her joint pains by 80% and GI sx  Seeing ortho, got steroid injections in both knees for OA  Today, ankles are sore, other joints pains improved with the gluten free diet   Swelling in the right ankle  Hands and fingers get numbness at night   MS of both knees, an hour, improves as she walks     + tension headaches   + dry eyes, uses eye drops  + miscarriage of her first pregnancy 8 weeks of gestation, no diagnoses   + used to have constipation, now more regular  + leg cramps  + itchy back, dry   + bilateral plantar fasciitis     PSH: Benign breast lumpectomy, cataracts bilaterally, appendectomy, cholecystectomy, left ovary removed  Allergies: NKDA   Habits: No smoking, no alcohol, no drugs   Social hx: ,  has dementia, 3 kids, retired teacher/   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     Patient Active Problem List   Diagnosis    Acquired hammertoe    Acute bacterial conjunctivitis    Acute UTI    Arthritis of both knees    Bilateral otitis media    Breast pain    Chondrocalcinosis    Chronic allergic rhinitis    Chronic depression    Cyst of left breast    Depression, major, recurrent, mild (CMS-HCC)    Discomfort of vagina    Dizziness    Dysuria    Cerebrovascular disease    Essential hypertension    High blood pressure    Chronic GERD    Gastroesophageal reflux disease    Gastritis     Hypercholesterolemia    Hyperlipidemia    Jaw pain    Neck pain    Right sided temporal headache    Long-term use of immunosuppressant medication    Mastoiditis of left side    Acute non-recurrent maxillary sinusitis    Chronic sinusitis    Maxillary sinusitis    Migraines    Mucus in stool    Obesity (BMI 30-39.9)    Orthostatic hypotension    Cervical myofascial pain syndrome    Osteoarthritis of neck    Otitis media, serous, acute    Panic attacks    Panic disorder    Pelvic pain    Peripheral neuropathy    Right knee pain    Seronegative rheumatoid arthritis (Multi)    Social anxiety disorder    Nasal bleeding    Sore throat    Throat pain    Swelling in right armpit    Urinary urgency    Vaginal candidiasis    Vulvar pruritus    Weight loss      History reviewed. No pertinent past medical history.     History reviewed. No pertinent surgical history.     Social History     Socioeconomic History    Marital status:      Spouse name: Not on file    Number of children: Not on file    Years of education: Not on file    Highest education level: Not on file   Occupational History    Not on file   Tobacco Use    Smoking status: Never    Smokeless tobacco: Never   Substance and Sexual Activity    Alcohol use: Never    Drug use: Never    Sexual activity: Not on file   Other Topics Concern    Not on file   Social History Narrative    Not on file     Social Determinants of Health     Financial Resource Strain: Not on file   Food Insecurity: Not on file   Transportation Needs: Not on file   Physical Activity: Not on file   Stress: Not on file   Social Connections: Not on file   Intimate Partner Violence: Not on file   Housing Stability: Not on file      Allergies   Allergen Reactions    Allerg Xt,D.Farinae-D.Pteronys Unknown    Amoxicillin-Pot Clavulanate Unknown    Bee Pollen Unknown    House Dust Unknown    Mold Unknown    Latex Itching and Rash      Current Outpatient Medications:     acetaminophen (Tylenol 8 HOUR)  650 mg ER tablet, Take 1 tablet (650 mg) by mouth., Disp: , Rfl:     amLODIPine (Norvasc) 10 mg tablet, Take 1 tablet (10 mg) by mouth once daily at bedtime., Disp: , Rfl:     DULoxetine (Cymbalta) 30 mg DR capsule, Take 1 capsule (30 mg) by mouth once daily., Disp: , Rfl:     fenofibrate micronized (Lofibra) 134 mg capsule, Take 1 capsule (134 mg) by mouth once daily with breakfast., Disp: , Rfl:     irbesartan-hydrochlorothiazide (Avalide) 150-12.5 mg tablet, Take by mouth., Disp: , Rfl:     omeprazole (PriLOSEC) 40 mg DR capsule, Take 1 capsule (40 mg) by mouth once daily. Take before a meal, Disp: , Rfl:      Objective   Visit Vitals  /77   Pulse 90   Temp 37 °C (98.6 °F)   Resp 20   Wt 78.9 kg (174 lb)   BMI 30.82 kg/m²   Smoking Status Never   BSA 1.87 m²      Physical Exam:  General: AAOx3, Cooperative  Head: normocephalic, atraumatic, no hair loss   Eyes: EOMI, conjunctiva clear, sclera white, anicteric  Ears: hearing intact  Nose: no deformity, no crusting   Skin: No rashes, ulcers or photosensitive areas  MSK: Upper Extremities:  Hand/Fingers: No erythema, edema, tenderness or warmth at DIP, PIP, or MCP joints, FROM grossly. Good hand . No nodules. No deformities   Wrists: No TTP, erythema, edema, warmth or tenderness at wrist, FROM grossly  Elbows: No tenderness, edema, erythema or warmth at elbows, FROM grossly. No nodules   Shoulders: No edema, erythema, tenderness or warmth at shoulders. FROM  Lower Extremities:   Hips: No obvious deformities. No joint tenderness, normal ROM grossly. Log roll test negative bilaterally. Khris test is negative bilaterally. Mild bilateral trochanteric bursae TTP  Knees: No tenderness, deformities, edema, rashes, or warmth, normal ROM grossly. No crepitus, no pes anserine bursa TTP   Spine: No spinal tenderness to palpation. No SI joint tenderness      Assessment/Plan    This is a 73 y.o. female, known to have seronegative RA?? and OA of the neck and knees,  knees, who presents for follow up  Last seen in 3/24    The patient's picture currently is not suggestive or RA or CTD, her joint pains improved after gluten free diet, no current synovitis on exam but recurred 2 weeks ago, more likely related to OA and fibromyalgia. Her work up with Dr. Donovan is positive for SANDOVAL and Anti-centromere, no sx currently of scleroderma. Repeat work up showed positive SANDOVAL and anti-centromere, only has GERD as manifestation of possible CREST syndrome. She tried 3 different DMARDs with GI SE to all 3, could be related to her diet or IBS, she also took steroids for 2 months without any improvement in her sx, suggestive more of mechanical and less of inflammatory disease at this moment. CCP was not done. Normal inflammatory markers, complements and muscle enzymes. No proteinuria. Another prednisone trial, helped with her ankle stiffness and swelling. Started exercising, doing meditation and an herbal supplement, in addition to steroid injections in the knees, feels much better now. No need for IS currently. Extensive discussion done with the patient and her daughter about all possible etiologies, manifestations and treatment. I answered all their questions     Labs:  1/24: CBC, CMP, C3, C4, SPEP, CPK, Aldolase, vitamin D 30, Uprt, CRP, ESR, TSH wnl  SANDOVAL 1:2560, Anti centromere >8, RF, HLA B27 negative   11/23: SANDOVAL positive, RF/CCP negative, CBC, SPEP, Uprt/Cr, Cr 1.13, AP 43, (Low), CRP, TSH, CK, C4, C3, B12, FT4 wnl  STEPHANIE panel showing anti-centromere 1188    Imaging:  Xray hands 1/24: OA bilaterally    Xray knees 3/2022 and 5/2022: No results     - Continue exercising  - Continue herbal supplements  - Continue meditation  - Do CCP, CBC, CMP, ESR, CRP, C3, C4, UA, Uprt, and vitamin D with next oliva  - Continue off gluten  - Tylenol as needed for pain  - Monitor for scleroderma symptoms, only GERD now  - Follow up with derm for eczema     RTC in 6 months    Plan, including risks and benefits,  was discussed with the patient, informed on how to reach us.     To schedule an appointment, call [phone]  To reach the rheumatology office, call [phone]    La Huang MD   Division of Rheumatology  [facility]

## 2024-05-15 ENCOUNTER — OFFICE VISIT (OUTPATIENT)
Dept: RHEUMATOLOGY | Facility: CLINIC | Age: 74
End: 2024-05-15
Payer: MEDICARE

## 2024-05-15 VITALS
DIASTOLIC BLOOD PRESSURE: 77 MMHG | TEMPERATURE: 98.6 F | RESPIRATION RATE: 20 BRPM | SYSTOLIC BLOOD PRESSURE: 143 MMHG | BODY MASS INDEX: 30.82 KG/M2 | WEIGHT: 174 LBS | HEART RATE: 90 BPM

## 2024-05-15 DIAGNOSIS — M19.041 PRIMARY OSTEOARTHRITIS OF BOTH HANDS: ICD-10-CM

## 2024-05-15 DIAGNOSIS — R76.8 ANTICENTROMERE ANTIBODIES PRESENT: ICD-10-CM

## 2024-05-15 DIAGNOSIS — E55.9 VITAMIN D DEFICIENCY: ICD-10-CM

## 2024-05-15 DIAGNOSIS — M25.50 POLYARTHRALGIA: Primary | ICD-10-CM

## 2024-05-15 DIAGNOSIS — M19.042 PRIMARY OSTEOARTHRITIS OF BOTH HANDS: ICD-10-CM

## 2024-05-15 DIAGNOSIS — R76.8 POSITIVE ANA (ANTINUCLEAR ANTIBODY): ICD-10-CM

## 2024-05-15 PROCEDURE — 3077F SYST BP >= 140 MM HG: CPT | Performed by: STUDENT IN AN ORGANIZED HEALTH CARE EDUCATION/TRAINING PROGRAM

## 2024-05-15 PROCEDURE — 1160F RVW MEDS BY RX/DR IN RCRD: CPT | Performed by: STUDENT IN AN ORGANIZED HEALTH CARE EDUCATION/TRAINING PROGRAM

## 2024-05-15 PROCEDURE — 1159F MED LIST DOCD IN RCRD: CPT | Performed by: STUDENT IN AN ORGANIZED HEALTH CARE EDUCATION/TRAINING PROGRAM

## 2024-05-15 PROCEDURE — 3078F DIAST BP <80 MM HG: CPT | Performed by: STUDENT IN AN ORGANIZED HEALTH CARE EDUCATION/TRAINING PROGRAM

## 2024-05-15 PROCEDURE — 99214 OFFICE O/P EST MOD 30 MIN: CPT | Performed by: STUDENT IN AN ORGANIZED HEALTH CARE EDUCATION/TRAINING PROGRAM

## 2024-11-11 ENCOUNTER — LAB (OUTPATIENT)
Dept: LAB | Facility: LAB | Age: 74
End: 2024-11-11
Payer: MEDICARE

## 2024-11-11 DIAGNOSIS — E55.9 VITAMIN D DEFICIENCY: ICD-10-CM

## 2024-11-11 DIAGNOSIS — M19.041 PRIMARY OSTEOARTHRITIS OF BOTH HANDS: ICD-10-CM

## 2024-11-11 DIAGNOSIS — M25.50 POLYARTHRALGIA: ICD-10-CM

## 2024-11-11 DIAGNOSIS — R76.8 POSITIVE ANA (ANTINUCLEAR ANTIBODY): ICD-10-CM

## 2024-11-11 DIAGNOSIS — M19.042 PRIMARY OSTEOARTHRITIS OF BOTH HANDS: ICD-10-CM

## 2024-11-11 DIAGNOSIS — R76.8 ANTICENTROMERE ANTIBODIES PRESENT: ICD-10-CM

## 2024-11-11 LAB
25(OH)D3 SERPL-MCNC: 46 NG/ML (ref 30–100)
ALBUMIN SERPL BCP-MCNC: 4.1 G/DL (ref 3.4–5)
ALP SERPL-CCNC: 42 U/L (ref 33–136)
ALT SERPL W P-5'-P-CCNC: 10 U/L (ref 7–45)
ANION GAP SERPL CALC-SCNC: 12 MMOL/L (ref 10–20)
APPEARANCE UR: CLEAR
AST SERPL W P-5'-P-CCNC: 20 U/L (ref 9–39)
BACTERIA #/AREA URNS AUTO: ABNORMAL /HPF
BASOPHILS # BLD AUTO: 0.06 X10*3/UL (ref 0–0.1)
BASOPHILS NFR BLD AUTO: 0.6 %
BILIRUB SERPL-MCNC: 0.4 MG/DL (ref 0–1.2)
BILIRUB UR STRIP.AUTO-MCNC: NEGATIVE MG/DL
BUN SERPL-MCNC: 29 MG/DL (ref 6–23)
C3 SERPL-MCNC: 182 MG/DL (ref 87–200)
C4 SERPL-MCNC: 34 MG/DL (ref 10–50)
CALCIUM SERPL-MCNC: 9.7 MG/DL (ref 8.6–10.3)
CCP IGG SERPL-ACNC: <1 U/ML
CHLORIDE SERPL-SCNC: 106 MMOL/L (ref 98–107)
CO2 SERPL-SCNC: 26 MMOL/L (ref 21–32)
COLOR UR: YELLOW
CREAT SERPL-MCNC: 1.2 MG/DL (ref 0.5–1.05)
CREAT UR-MCNC: 143.8 MG/DL (ref 20–320)
CRP SERPL-MCNC: 0.51 MG/DL
EGFRCR SERPLBLD CKD-EPI 2021: 48 ML/MIN/1.73M*2
EOSINOPHIL # BLD AUTO: 0.36 X10*3/UL (ref 0–0.4)
EOSINOPHIL NFR BLD AUTO: 3.5 %
ERYTHROCYTE [DISTWIDTH] IN BLOOD BY AUTOMATED COUNT: 15.9 % (ref 11.5–14.5)
ERYTHROCYTE [SEDIMENTATION RATE] IN BLOOD BY WESTERGREN METHOD: 10 MM/H (ref 0–30)
GLUCOSE SERPL-MCNC: 83 MG/DL (ref 74–99)
GLUCOSE UR STRIP.AUTO-MCNC: NEGATIVE MG/DL
HCT VFR BLD AUTO: 38.3 % (ref 36–46)
HGB BLD-MCNC: 11.8 G/DL (ref 12–16)
IMM GRANULOCYTES # BLD AUTO: 0.03 X10*3/UL (ref 0–0.5)
IMM GRANULOCYTES NFR BLD AUTO: 0.3 % (ref 0–0.9)
KETONES UR STRIP.AUTO-MCNC: NEGATIVE MG/DL
LEUKOCYTE ESTERASE UR QL STRIP.AUTO: ABNORMAL
LYMPHOCYTES # BLD AUTO: 1.95 X10*3/UL (ref 0.8–3)
LYMPHOCYTES NFR BLD AUTO: 18.7 %
MCH RBC QN AUTO: 26.4 PG (ref 26–34)
MCHC RBC AUTO-ENTMCNC: 30.8 G/DL (ref 32–36)
MCV RBC AUTO: 86 FL (ref 80–100)
MONOCYTES # BLD AUTO: 1.15 X10*3/UL (ref 0.05–0.8)
MONOCYTES NFR BLD AUTO: 11 %
MUCOUS THREADS #/AREA URNS AUTO: ABNORMAL /LPF
NEUTROPHILS # BLD AUTO: 6.87 X10*3/UL (ref 1.6–5.5)
NEUTROPHILS NFR BLD AUTO: 65.9 %
NITRITE UR QL STRIP.AUTO: NEGATIVE
NRBC BLD-RTO: 0 /100 WBCS (ref 0–0)
PH UR STRIP.AUTO: 6 [PH]
PLATELET # BLD AUTO: 317 X10*3/UL (ref 150–450)
POTASSIUM SERPL-SCNC: 4.2 MMOL/L (ref 3.5–5.3)
PROT SERPL-MCNC: 6.8 G/DL (ref 6.4–8.2)
PROT UR STRIP.AUTO-MCNC: NEGATIVE MG/DL
PROT UR-ACNC: 15 MG/DL (ref 5–24)
PROT/CREAT UR: 0.1 MG/MG CREAT (ref 0–0.17)
RBC # BLD AUTO: 4.47 X10*6/UL (ref 4–5.2)
RBC # UR STRIP.AUTO: NEGATIVE /UL
RBC #/AREA URNS AUTO: ABNORMAL /HPF
SODIUM SERPL-SCNC: 140 MMOL/L (ref 136–145)
SP GR UR STRIP.AUTO: 1.02
SQUAMOUS #/AREA URNS AUTO: ABNORMAL /HPF
TRANS CELLS #/AREA UR COMP ASSIST: ABNORMAL /HPF
UROBILINOGEN UR STRIP.AUTO-MCNC: ABNORMAL MG/DL
WBC # BLD AUTO: 10.4 X10*3/UL (ref 4.4–11.3)
WBC #/AREA URNS AUTO: ABNORMAL /HPF

## 2024-11-11 PROCEDURE — 86160 COMPLEMENT ANTIGEN: CPT

## 2024-11-11 PROCEDURE — 82306 VITAMIN D 25 HYDROXY: CPT

## 2024-11-11 PROCEDURE — 82570 ASSAY OF URINE CREATININE: CPT

## 2024-11-11 PROCEDURE — 85025 COMPLETE CBC W/AUTO DIFF WBC: CPT

## 2024-11-11 PROCEDURE — 36415 COLL VENOUS BLD VENIPUNCTURE: CPT

## 2024-11-11 PROCEDURE — 81001 URINALYSIS AUTO W/SCOPE: CPT

## 2024-11-11 PROCEDURE — 86200 CCP ANTIBODY: CPT

## 2024-11-11 PROCEDURE — 86140 C-REACTIVE PROTEIN: CPT

## 2024-11-11 PROCEDURE — 80053 COMPREHEN METABOLIC PANEL: CPT

## 2024-11-11 PROCEDURE — 85652 RBC SED RATE AUTOMATED: CPT

## 2024-11-11 PROCEDURE — 84156 ASSAY OF PROTEIN URINE: CPT

## 2024-11-18 ENCOUNTER — APPOINTMENT (OUTPATIENT)
Dept: RHEUMATOLOGY | Facility: CLINIC | Age: 74
End: 2024-11-18
Payer: MEDICARE

## 2025-02-25 ENCOUNTER — APPOINTMENT (OUTPATIENT)
Dept: RHEUMATOLOGY | Facility: CLINIC | Age: 75
End: 2025-02-25
Payer: MEDICARE

## 2025-08-06 ENCOUNTER — OFFICE VISIT (OUTPATIENT)
Dept: URGENT CARE | Age: 75
End: 2025-08-06
Payer: MEDICARE

## 2025-08-06 VITALS
TEMPERATURE: 97.9 F | BODY MASS INDEX: 30.83 KG/M2 | HEART RATE: 88 BPM | SYSTOLIC BLOOD PRESSURE: 148 MMHG | OXYGEN SATURATION: 94 % | RESPIRATION RATE: 17 BRPM | DIASTOLIC BLOOD PRESSURE: 77 MMHG | HEIGHT: 63 IN | WEIGHT: 174 LBS

## 2025-08-06 DIAGNOSIS — N39.0 URINARY TRACT INFECTION WITHOUT HEMATURIA, SITE UNSPECIFIED: ICD-10-CM

## 2025-08-06 LAB
POC APPEARANCE, URINE: CLEAR
POC BILIRUBIN, URINE: NEGATIVE
POC BLOOD, URINE: ABNORMAL
POC COLOR, URINE: YELLOW
POC GLUCOSE, URINE: NEGATIVE MG/DL
POC KETONES, URINE: NEGATIVE MG/DL
POC LEUKOCYTES, URINE: ABNORMAL
POC NITRITE,URINE: NEGATIVE
POC PH, URINE: 6 PH
POC PROTEIN, URINE: NEGATIVE MG/DL
POC SPECIFIC GRAVITY, URINE: 1.02
POC UROBILINOGEN, URINE: 0.2 EU/DL

## 2025-08-06 RX ORDER — CLONAZEPAM 1 MG/1
1 TABLET ORAL EVERY 12 HOURS PRN
COMMUNITY

## 2025-08-06 RX ORDER — NITROFURANTOIN 25; 75 MG/1; MG/1
100 CAPSULE ORAL 2 TIMES DAILY
Qty: 14 CAPSULE | Refills: 0 | Status: SHIPPED | OUTPATIENT
Start: 2025-08-06 | End: 2025-08-13

## 2025-08-06 RX ORDER — DULOXETINE HCL 100 %
POWDER (GRAM) MISCELLANEOUS
COMMUNITY

## 2025-08-06 RX ORDER — CHOLECALCIFEROL (VITAMIN D3) 125 MCG
220 CAPSULE ORAL
COMMUNITY

## 2025-08-06 ASSESSMENT — PATIENT HEALTH QUESTIONNAIRE - PHQ9
SUM OF ALL RESPONSES TO PHQ9 QUESTIONS 1 AND 2: 0
2. FEELING DOWN, DEPRESSED OR HOPELESS: NOT AT ALL
1. LITTLE INTEREST OR PLEASURE IN DOING THINGS: NOT AT ALL

## 2025-08-06 ASSESSMENT — ENCOUNTER SYMPTOMS
FEVER: 0
DEPRESSION: 0
OCCASIONAL FEELINGS OF UNSTEADINESS: 1
ABDOMINAL PAIN: 1
LOSS OF SENSATION IN FEET: 0
BACK PAIN: 0
FATIGUE: 1
DIARRHEA: 0

## 2025-08-06 NOTE — PROGRESS NOTES
"Subjective   Patient ID: Brielle Reyes is a 75 y.o. female. They present today with a chief complaint of Urinary Problem (1 week).    History of Present Illness  Patient is a 75 year old female presenting for UTI. Symptoms started about a week ago, she was on vacation so she was taking cranberry pills but today symptoms significantly worsened. She reports fatigue, nausea, hot flashes but no fever, lower abdominal pain and urinary urgency. Took a home azo test that was positive. Last UTI was over a year ago.      History provided by:  Patient   used: No        Past Medical History  Allergies as of 08/06/2025 - Reviewed 08/06/2025   Allergen Reaction Noted    Allerg xt,d.farinae-d.pteronys Unknown 07/10/2023    Bee pollen Unknown 01/22/2024    House dust Unknown 01/22/2024    Mold Unknown 07/10/2023    Latex Itching and Rash 09/29/2013       Prescriptions Prior to Admission[1]     Medical History[2]    Surgical History[3]     reports that she has never smoked. She has never used smokeless tobacco. She reports that she does not drink alcohol and does not use drugs.    Review of Systems  Review of Systems   Constitutional:  Positive for fatigue. Negative for fever.   Gastrointestinal:  Positive for abdominal pain. Negative for diarrhea.   Genitourinary:  Positive for urgency.   Musculoskeletal:  Negative for back pain.                                  Objective    Vitals:    08/06/25 1723   BP: 148/77   Pulse: 88   Resp: 17   Temp: 36.6 °C (97.9 °F)   TempSrc: Oral   SpO2: 94%   Weight: 78.9 kg (174 lb)   Height: 1.6 m (5' 3\")     No LMP recorded. Patient is postmenopausal.    Physical Exam  Constitutional:       General: She is not in acute distress.     Appearance: Normal appearance. She is not ill-appearing or toxic-appearing.   HENT:      Head: Normocephalic.     Eyes:      General:         Right eye: No discharge.         Left eye: No discharge.      Conjunctiva/sclera: Conjunctivae normal. "     Neck:      Trachea: Phonation normal.     Cardiovascular:      Rate and Rhythm: Normal rate.      Heart sounds: No murmur heard.     No friction rub. No gallop.   Pulmonary:      Effort: No respiratory distress.      Breath sounds: Normal breath sounds. No stridor. No wheezing.   Abdominal:      Tenderness: There is abdominal tenderness (suprapubic). There is no right CVA tenderness, left CVA tenderness or guarding.     Skin:     Findings: No rash.     Neurological:      Mental Status: She is alert.     Psychiatric:         Mood and Affect: Mood normal.         Behavior: Behavior normal.         Thought Content: Thought content normal.         Procedures    Point of Care Test & Imaging Results from this visit  Results for orders placed or performed in visit on 08/06/25   POCT UA Automated manually resulted   Result Value Ref Range    POC Color, Urine Yellow Straw, Yellow, Light-Yellow    POC Appearance, Urine Clear Clear    POC Glucose, Urine NEGATIVE NEGATIVE mg/dl    POC Bilirubin, Urine NEGATIVE NEGATIVE    POC Ketones, Urine NEGATIVE NEGATIVE mg/dl    POC Specific Gravity, Urine 1.025 1.005 - 1.035    POC Blood, Urine TRACE-Intact (A) NEGATIVE    POC PH, Urine 6.0 No Reference Range Established PH    POC Protein, Urine NEGATIVE NEGATIVE mg/dl    POC Urobilinogen, Urine 0.2 0.2, 1.0 EU/DL    Poc Nitrite, Urine NEGATIVE NEGATIVE    POC Leukocytes, Urine LARGE (3+) (A) NEGATIVE      Imaging  No results found.    Cardiology, Vascular, and Other Imaging  No other imaging results found for the past 2 days      Diagnostic study results (if any) were reviewed by Racheal Finney PA-C.    Assessment/Plan   Allergies, medications, history, and pertinent labs/EKGs/Imaging reviewed by Racheal Finney PA-C.     Medical Decision Making  Patient is a 75 year old female presenting with urinary concern. Hemodynamically stable. UA suspicious for infection. No CVA tenderness or significant abdominal tenderness to suggest  ureterolithiasis or pyelonephritis. Will send urine for culture but start antibiotics pending results given presenting symptoms and results of UA. Vitals not suggestive of sepsis. ER if flank pain, abdominal pain, vomiting, fever, lethargy.       At time of discharge, patient was clinically well-appearing and appropriate for outpatient management. The patient/parent/guardian was educated regarding diagnosis, supportive care, OTC and Rx medications. The patient/parent/guardian was given the opportunity to ask questions prior to discharge. They verbalized understanding of discussion of treatment plan, expected course of illness and/or injury, indications on when to return to , when to seek further evaluation in ED/call 911, and the need to follow up with PCP and/or specialist as referred. Patient/parent/guardian was provided with work/school documentation if requested. Patient stable upon discharge.     Orders and Diagnoses  Diagnoses and all orders for this visit:  Urinary tract infection without hematuria, site unspecified  -     POCT UA Automated manually resulted  -     Urine Culture  -     nitrofurantoin, macrocrystal-monohydrate, (Macrobid) 100 mg capsule; Take 1 capsule (100 mg) by mouth 2 times a day for 7 days.      Medical Admin Record      Patient disposition: Home    Electronically signed by Racheal Finney PA-C  5:47 PM           [1] (Not in a hospital admission)   [2] No past medical history on file.  [3] No past surgical history on file.

## 2025-08-09 LAB — BACTERIA UR CULT: NORMAL

## 2025-09-03 ENCOUNTER — OFFICE (OUTPATIENT)
Dept: URBAN - METROPOLITAN AREA CLINIC 26 | Facility: CLINIC | Age: 75
End: 2025-09-03
Payer: MEDICARE

## 2025-09-03 VITALS
SYSTOLIC BLOOD PRESSURE: 167 MMHG | WEIGHT: 173 LBS | DIASTOLIC BLOOD PRESSURE: 69 MMHG | HEART RATE: 102 BPM | TEMPERATURE: 97.3 F | HEIGHT: 63 IN

## 2025-09-03 DIAGNOSIS — K21.9 GASTRO-ESOPHAGEAL REFLUX DISEASE WITHOUT ESOPHAGITIS: ICD-10-CM

## 2025-09-03 DIAGNOSIS — D50.9 IRON DEFICIENCY ANEMIA, UNSPECIFIED: ICD-10-CM

## 2025-09-03 PROCEDURE — 99204 OFFICE O/P NEW MOD 45 MIN: CPT | Performed by: INTERNAL MEDICINE
